# Patient Record
Sex: MALE | Race: WHITE | URBAN - METROPOLITAN AREA
[De-identification: names, ages, dates, MRNs, and addresses within clinical notes are randomized per-mention and may not be internally consistent; named-entity substitution may affect disease eponyms.]

---

## 2021-05-28 ENCOUNTER — OFFICE VISIT (OUTPATIENT)
Dept: URBAN - METROPOLITAN AREA CLINIC 51 | Facility: CLINIC | Age: 86
End: 2021-05-28
Payer: MEDICARE

## 2021-05-28 DIAGNOSIS — H52.4 PRESBYOPIA: ICD-10-CM

## 2021-05-28 DIAGNOSIS — H18.513 FUCHS' DYSTROPHY: Primary | ICD-10-CM

## 2021-05-28 DIAGNOSIS — H35.372 PUCKERING OF MACULA, LEFT EYE: ICD-10-CM

## 2021-05-28 DIAGNOSIS — H25.13 AGE-RELATED NUCLEAR CATARACT, BILATERAL: ICD-10-CM

## 2021-05-28 DIAGNOSIS — H43.313 VITREOUS MEMBRANES AND STRANDS, BILATERAL: ICD-10-CM

## 2021-05-28 PROCEDURE — 92134 CPTRZ OPH DX IMG PST SGM RTA: CPT | Performed by: OPTOMETRIST

## 2021-05-28 PROCEDURE — 92004 COMPRE OPH EXAM NEW PT 1/>: CPT | Performed by: OPTOMETRIST

## 2021-05-28 ASSESSMENT — INTRAOCULAR PRESSURE
OS: 14
OD: 16

## 2021-05-28 ASSESSMENT — VISUAL ACUITY
OD: 20/25
OS: 20/20

## 2021-05-28 ASSESSMENT — KERATOMETRY
OS: 45.34
OD: 45.98

## 2021-05-28 NOTE — IMPRESSION/PLAN
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 Plan: Explained condition to pt. Start Richard 128 BID OU (morning and lunch) If cant find Richard, can use Retaine NaCl . 
RTC 1-2 mos FU

## 2021-05-28 NOTE — IMPRESSION/PLAN
Impression: Puckering of macula, left eye: H35.372. Plan: Ed pt on condition and no effect on [address] currently. RTC with any changes in vision.

## 2021-05-28 NOTE — IMPRESSION/PLAN
Impression: Age-related nuclear cataract, bilateral: H25.13. Plan: Ed pt on condition. Cataracts not visually significant or effecting ADLs/vision. Will continue to monitor, RTC with changes in vision sooner.

## 2021-05-28 NOTE — IMPRESSION/PLAN
Impression: Tear film insufficiency of bilateral lacrimal glands: H04.123. Plan: Recommend ATs qd-bid both eyes. RTC sooner if symptoms worsen or do not improve.

## 2021-07-09 ENCOUNTER — OFFICE VISIT (OUTPATIENT)
Dept: URBAN - METROPOLITAN AREA CLINIC 51 | Facility: CLINIC | Age: 86
End: 2021-07-09
Payer: MEDICARE

## 2021-07-09 DIAGNOSIS — H04.123 TEAR FILM INSUFFICIENCY OF BILATERAL LACRIMAL GLANDS: ICD-10-CM

## 2021-07-09 PROCEDURE — 99213 OFFICE O/P EST LOW 20 MIN: CPT | Performed by: OPTOMETRIST

## 2021-07-09 PROCEDURE — 76514 ECHO EXAM OF EYE THICKNESS: CPT | Performed by: OPTOMETRIST

## 2021-07-09 ASSESSMENT — VISUAL ACUITY
OD: 20/25
OS: 20/25

## 2021-07-09 NOTE — IMPRESSION/PLAN
Impression: Presbyopia: H52.4. Plan: No significant change in SRx, but patient wishes to update glasses. Release new SRx.

## 2021-07-09 NOTE — IMPRESSION/PLAN
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 Plan: Educated patient on condition. Patient notices improvement with symptoms after starting Richard 128. Recommend patient continue Richard 128 BID OU (morning and lunch) Continue AT's for comfort Will follow up in 5-6 months: PACHs/[facility]

## 2021-07-09 NOTE — IMPRESSION/PLAN
Impression: Tear film insufficiency of bilateral lacrimal glands: H04.123. Plan: Recommend continue AT's 2-3 times a day OU.

## 2022-01-06 ENCOUNTER — OFFICE VISIT (OUTPATIENT)
Dept: URBAN - METROPOLITAN AREA CLINIC 51 | Facility: CLINIC | Age: 87
End: 2022-01-06
Payer: MEDICARE

## 2022-01-06 PROCEDURE — 76514 ECHO EXAM OF EYE THICKNESS: CPT | Performed by: OPTOMETRIST

## 2022-01-06 PROCEDURE — 99213 OFFICE O/P EST LOW 20 MIN: CPT | Performed by: OPTOMETRIST

## 2022-01-06 NOTE — IMPRESSION/PLAN
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 Plan: Stable appearance since last visit. Patient notices improvement with symptoms after starting Richard 128. Pachymetry: 563/563 (average thickness OU) Recommend patient continue Richard 128 BID OU (morning and lunch) Continue AT's as needed for comfort RTC in June 2022 for CE + PACHS

## 2022-06-01 ENCOUNTER — OFFICE VISIT (OUTPATIENT)
Dept: URBAN - METROPOLITAN AREA CLINIC 51 | Facility: CLINIC | Age: 87
End: 2022-06-01
Payer: MEDICARE

## 2022-06-01 DIAGNOSIS — H04.123 TEAR FILM INSUFFICIENCY OF BILATERAL LACRIMAL GLANDS: ICD-10-CM

## 2022-06-01 DIAGNOSIS — H18.513 FUCHS' DYSTROPHY: Primary | ICD-10-CM

## 2022-06-01 DIAGNOSIS — I10 ESSENTIAL (PRIMARY) HYPERTENSION: ICD-10-CM

## 2022-06-01 DIAGNOSIS — H25.13 AGE-RELATED NUCLEAR CATARACT, BILATERAL: ICD-10-CM

## 2022-06-01 DIAGNOSIS — H02.834 DERMATOCHALASIS OF LEFT UPPER LID: ICD-10-CM

## 2022-06-01 DIAGNOSIS — H43.813 VITREOUS DEGENERATION, BILATERAL: ICD-10-CM

## 2022-06-01 DIAGNOSIS — H35.372 PUCKERING OF MACULA, LEFT EYE: ICD-10-CM

## 2022-06-01 DIAGNOSIS — H52.4 PRESBYOPIA: ICD-10-CM

## 2022-06-01 DIAGNOSIS — H02.831 DERMATOCHALASIS OF RIGHT UPPER LID: ICD-10-CM

## 2022-06-01 PROCEDURE — 76514 ECHO EXAM OF EYE THICKNESS: CPT | Performed by: OPTOMETRIST

## 2022-06-01 PROCEDURE — 92014 COMPRE OPH EXAM EST PT 1/>: CPT | Performed by: OPTOMETRIST

## 2022-06-01 PROCEDURE — 92134 CPTRZ OPH DX IMG PST SGM RTA: CPT | Performed by: OPTOMETRIST

## 2022-06-01 ASSESSMENT — INTRAOCULAR PRESSURE
OD: 16
OS: 16

## 2022-06-01 ASSESSMENT — VISUAL ACUITY
OD: 20/25
OS: 20/25

## 2022-06-01 NOTE — IMPRESSION/PLAN
Impression: Presbyopia: H52.4. Plan: Discussed mild changes in prescription, patient declined and wishes to continue with current specs.

## 2022-06-01 NOTE — IMPRESSION/PLAN
Impression: Tear film insufficiency of bilateral lacrimal glands: H04.123. Plan: Continue AT's at least 1-2 times per day or more OU.

## 2022-06-01 NOTE — IMPRESSION/PLAN
Impression: Age-related nuclear cataract, bilateral: H25.13. Plan: Cataracts are not visually significant or effecting ADLs/vision enough to warrant cataract surgery at this time. Patient will monitor vision closely and contact our office with any changes/ worsening in vision. Will re-evaluate on return visit.

## 2022-06-01 NOTE — IMPRESSION/PLAN
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 Plan: Stable appearance since last visit. Okay to use Richard 128 5% BART. Pachymetry: 563/573 (average and stable thickness OU) Continue Richard 128 drops or BART BID Continue AT's as needed for comfort RTC in 6 months for follow up + pachymetry

## 2022-06-01 NOTE — IMPRESSION/PLAN
Impression: Dermatochalasis of left upper lid: H02.834. Plan: Not bothersome or visually significant to patient at this time. No treatment is currently recommended. Can refer to Oculoplastics in the future if worsens/ becomes bothersome. Monitor.

## 2022-06-01 NOTE — IMPRESSION/PLAN
Impression: Dermatochalasis of right upper lid: H02.831. Plan: Not bothersome or visually significant to patient at this time. No treatment is currently recommended. Can refer to Oculoplastics in the future if worsens/ becomes bothersome. Monitor.

## 2022-06-01 NOTE — IMPRESSION/PLAN
Impression: Essential (primary) hypertension: I10. Plan: BP elevated today in office: 162/100. Recent decrease in meds 2' white coat syndrome; BP was going too low at home. Dilated with Tropicamide 1%. Recommend follow up with PCP for good BP control.

## 2022-06-01 NOTE — IMPRESSION/PLAN
Impression: Puckering of macula, left eye: H35.372. Plan: ERM w/ no CME. MAC OCT taken. Not having any effect on patients vision. Patient will monitor vision closely and contact our office with any changes/worsening/distortion of vision. Monitor annually with MAC OCT.

## 2022-12-02 ENCOUNTER — OFFICE VISIT (OUTPATIENT)
Dept: URBAN - METROPOLITAN AREA CLINIC 51 | Facility: CLINIC | Age: 87
End: 2022-12-02
Payer: MEDICARE

## 2022-12-02 DIAGNOSIS — H52.4 PRESBYOPIA: ICD-10-CM

## 2022-12-02 DIAGNOSIS — I10 ESSENTIAL (PRIMARY) HYPERTENSION: ICD-10-CM

## 2022-12-02 DIAGNOSIS — H18.513 FUCHS' DYSTROPHY: Primary | ICD-10-CM

## 2022-12-02 PROCEDURE — 99213 OFFICE O/P EST LOW 20 MIN: CPT | Performed by: OPTOMETRIST

## 2022-12-02 PROCEDURE — 76514 ECHO EXAM OF EYE THICKNESS: CPT | Performed by: OPTOMETRIST

## 2022-12-02 ASSESSMENT — INTRAOCULAR PRESSURE
OS: 16
OD: 15

## 2022-12-02 NOTE — IMPRESSION/PLAN
Impression: Presbyopia: H52.4. Plan: Demonstrated and reviewed today's refraction vs. current specs with patient, patient prefers.  Release new SRx per patient request.

## 2022-12-02 NOTE — IMPRESSION/PLAN
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 *Pachymetry (06/01/2022): 563/573 (average and stable thickness OU) Plan: Stable appearance since last visit. Okay to continue Richard 128 5% BART. Pachymetry: 576/582 (thicker since last visit) Continue Richard 128 drops or BART QAM or BID Continue AT's as needed for comfort RTC in 6 months for CE + pachymetry

## 2022-12-02 NOTE — IMPRESSION/PLAN
Impression: Essential (primary) hypertension: I10.
 -BP elevated today in office: 176/93, per patient white coat syndrome. Monitor at home and usually 120 is max. Recently taking off all BP medication since heart procedure. Plan: Continue to monitor BP at home and continue management with outside provider.

## 2023-06-08 ENCOUNTER — OFFICE VISIT (OUTPATIENT)
Dept: URBAN - METROPOLITAN AREA CLINIC 51 | Facility: CLINIC | Age: 88
End: 2023-06-08
Payer: MEDICARE

## 2023-06-08 DIAGNOSIS — H52.4 PRESBYOPIA: ICD-10-CM

## 2023-06-08 DIAGNOSIS — I10 ESSENTIAL (PRIMARY) HYPERTENSION: ICD-10-CM

## 2023-06-08 DIAGNOSIS — H18.513 FUCHS' DYSTROPHY: Primary | ICD-10-CM

## 2023-06-08 DIAGNOSIS — H43.813 VITREOUS DEGENERATION, BILATERAL: ICD-10-CM

## 2023-06-08 DIAGNOSIS — H35.372 PUCKERING OF MACULA, LEFT EYE: ICD-10-CM

## 2023-06-08 DIAGNOSIS — H25.13 AGE-RELATED NUCLEAR CATARACT, BILATERAL: ICD-10-CM

## 2023-06-08 PROCEDURE — 99214 OFFICE O/P EST MOD 30 MIN: CPT | Performed by: OPTOMETRIST

## 2023-06-08 PROCEDURE — 92015 DETERMINE REFRACTIVE STATE: CPT | Performed by: OPTOMETRIST

## 2023-06-08 PROCEDURE — 92134 CPTRZ OPH DX IMG PST SGM RTA: CPT | Performed by: OPTOMETRIST

## 2023-06-08 PROCEDURE — 76514 ECHO EXAM OF EYE THICKNESS: CPT | Performed by: OPTOMETRIST

## 2023-06-08 ASSESSMENT — INTRAOCULAR PRESSURE
OD: 15
OS: 15

## 2023-06-08 ASSESSMENT — KERATOMETRY
OS: 45.00
OD: 45.50

## 2023-06-08 ASSESSMENT — VISUAL ACUITY
OD: 20/25
OS: 20/20

## 2023-06-08 NOTE — IMPRESSION/PLAN
Impression: Presbyopia: H52.4. Plan: Demonstrated and reviewed today's refraction vs. current specs with patient, patient prefers. Release new SRx for optimal vision update - no charge per doctor.

## 2023-06-08 NOTE — IMPRESSION/PLAN
Impression: Essential (primary) hypertension: I10. Plan: Continue to monitor BP at home and continue management with outside provider for good BP control.

## 2023-06-08 NOTE — IMPRESSION/PLAN
Impression: Puckering of macula, left eye: H35.372. Plan: ERM formation w/ no CME. MAC OCT taken today. Patient will monitor vision closely and contact our office with any changes/worsening/distortion of vision. Monitor annually with MAC OCT.

## 2023-06-08 NOTE — IMPRESSION/PLAN
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 *Pachymetry (06/01/2022): 563/573 (average and stable thickness OU) Plan: Stable appearance since last visit. Okay to continue Richard 128 5% BART. Pachymetry: 573/579 (thinner since last visit) Continue Richard 128 drops or BART QAM or BID Continue AT's as needed for comfort

## 2023-06-08 NOTE — IMPRESSION/PLAN
Impression: Age-related nuclear cataract, bilateral: H25.13. Plan: Cataracts are not bothersome enough to patient to warrant surgical intervention at this time. Patient will monitor vision closely and contact our office with any changes/ worsening in vision. Will re-evaluate on return visit.

## 2024-01-24 ENCOUNTER — OFFICE VISIT (OUTPATIENT)
Dept: URBAN - METROPOLITAN AREA CLINIC 51 | Facility: CLINIC | Age: 89
End: 2024-01-24
Payer: COMMERCIAL

## 2024-01-24 DIAGNOSIS — H52.4 PRESBYOPIA: ICD-10-CM

## 2024-01-24 DIAGNOSIS — H18.513 FUCHS' DYSTROPHY: Primary | ICD-10-CM

## 2024-01-24 PROCEDURE — 76514 ECHO EXAM OF EYE THICKNESS: CPT | Performed by: OPTOMETRIST

## 2024-01-24 PROCEDURE — 99213 OFFICE O/P EST LOW 20 MIN: CPT | Performed by: OPTOMETRIST

## 2024-07-24 ENCOUNTER — OFFICE VISIT (OUTPATIENT)
Dept: URBAN - METROPOLITAN AREA CLINIC 51 | Facility: CLINIC | Age: 89
End: 2024-07-24
Payer: COMMERCIAL

## 2024-07-24 DIAGNOSIS — H35.372 PUCKERING OF MACULA, LEFT EYE: ICD-10-CM

## 2024-07-24 DIAGNOSIS — H25.13 AGE-RELATED NUCLEAR CATARACT, BILATERAL: ICD-10-CM

## 2024-07-24 DIAGNOSIS — H43.813 VITREOUS DEGENERATION, BILATERAL: ICD-10-CM

## 2024-07-24 DIAGNOSIS — H18.513 ENDOTHELIAL CORNEAL DYSTROPHY, BILATERAL: Primary | ICD-10-CM

## 2024-07-24 PROCEDURE — 76514 ECHO EXAM OF EYE THICKNESS: CPT | Performed by: OPTOMETRIST

## 2024-07-24 PROCEDURE — 92134 CPTRZ OPH DX IMG PST SGM RTA: CPT | Performed by: OPTOMETRIST

## 2024-07-24 PROCEDURE — 92014 COMPRE OPH EXAM EST PT 1/>: CPT | Performed by: OPTOMETRIST

## 2024-07-24 ASSESSMENT — INTRAOCULAR PRESSURE
OS: 18
OD: 21

## 2025-08-12 ENCOUNTER — OFFICE VISIT (OUTPATIENT)
Dept: URBAN - METROPOLITAN AREA CLINIC 51 | Facility: CLINIC | Age: 89
End: 2025-08-12
Payer: COMMERCIAL

## 2025-08-12 DIAGNOSIS — H52.4 PRESBYOPIA: ICD-10-CM

## 2025-08-12 DIAGNOSIS — H18.513 FUCHS' DYSTROPHY: ICD-10-CM

## 2025-08-12 DIAGNOSIS — H25.13 AGE-RELATED NUCLEAR CATARACT, BILATERAL: ICD-10-CM

## 2025-08-12 DIAGNOSIS — H35.372 PUCKERING OF MACULA, LEFT EYE: Primary | ICD-10-CM

## 2025-08-12 DIAGNOSIS — I10 ESSENTIAL (PRIMARY) HYPERTENSION: ICD-10-CM

## 2025-08-12 DIAGNOSIS — H43.813 VITREOUS DEGENERATION, BILATERAL: ICD-10-CM

## 2025-08-12 PROCEDURE — 92134 CPTRZ OPH DX IMG PST SGM RTA: CPT | Performed by: OPTOMETRIST

## 2025-08-12 PROCEDURE — 99214 OFFICE O/P EST MOD 30 MIN: CPT | Performed by: OPTOMETRIST

## 2025-08-12 ASSESSMENT — KERATOMETRY
OD: 45.88
OS: 45.13

## 2025-08-12 ASSESSMENT — VISUAL ACUITY
OS: 20/20
OD: 20/20

## 2025-08-12 ASSESSMENT — INTRAOCULAR PRESSURE
OS: 19
OD: 22